# Patient Record
Sex: FEMALE | Race: WHITE | NOT HISPANIC OR LATINO
[De-identification: names, ages, dates, MRNs, and addresses within clinical notes are randomized per-mention and may not be internally consistent; named-entity substitution may affect disease eponyms.]

---

## 2024-02-05 ENCOUNTER — TRANSCRIPTION ENCOUNTER (OUTPATIENT)
Age: 54
End: 2024-02-05

## 2024-03-11 ENCOUNTER — APPOINTMENT (OUTPATIENT)
Dept: UROGYNECOLOGY | Facility: CLINIC | Age: 54
End: 2024-03-11
Payer: COMMERCIAL

## 2024-03-11 VITALS — WEIGHT: 166 LBS | BODY MASS INDEX: 31.34 KG/M2 | HEIGHT: 61 IN

## 2024-03-11 DIAGNOSIS — R93.89 ABNORMAL FINDINGS ON DIAGNOSTIC IMAGING OF OTHER SPECIFIED BODY STRUCTURES: ICD-10-CM

## 2024-03-11 DIAGNOSIS — R31.0 GROSS HEMATURIA: ICD-10-CM

## 2024-03-11 PROBLEM — Z00.00 ENCOUNTER FOR PREVENTIVE HEALTH EXAMINATION: Status: ACTIVE | Noted: 2024-03-11

## 2024-03-11 PROCEDURE — 99459 PELVIC EXAMINATION: CPT

## 2024-03-11 PROCEDURE — 51701 INSERT BLADDER CATHETER: CPT | Mod: 59

## 2024-03-11 PROCEDURE — 81003 URINALYSIS AUTO W/O SCOPE: CPT | Mod: QW

## 2024-03-11 PROCEDURE — 99204 OFFICE O/P NEW MOD 45 MIN: CPT | Mod: 25

## 2024-03-11 RX ORDER — PHENAZOPYRIDINE HYDROCHLORIDE 200 MG/1
200 TABLET ORAL 3 TIMES DAILY
Qty: 15 | Refills: 3 | Status: ACTIVE | COMMUNITY
Start: 2024-03-11 | End: 1900-01-01

## 2024-03-11 NOTE — ASSESSMENT
[FreeTextEntry1] : Patient is a 54-year-old multipara who is presenting with symptoms of intermittent gross hematuria, concerns for recurrent urinary tract infection, mixed urinary incontinence (stress greater than urgency) as well as urinary urgency for past 2 years.  She does not recall any positive urine culture except 1 growing group B strep.  On exam, she was noted to have stage II pelvic organ prolapse mainly involving the anterior vaginal wall, normal postvoid residual, hypermobile urethra and a negative cough stress test.  Patient has history of uterine polyp removal.  She was noted to have thickened endometrium on a CT scan from August 2023.  She reports having a normal pelvic ultrasound in October 2023.  However she had recurrent episode of gross hematuria most recently in January 2024.

## 2024-03-11 NOTE — DISCUSSION/SUMMARY
[FreeTextEntry1] : Patient was counseled regarding evaluation and management of gross hematuria, frequent urinary tract infections, mixed urinary incontinence, and pelvic organ prolapse.  Following management plan was outlined after having a detailed discussion with the patient: 1. UA, culture ordered from cath specimen today. If it returns positive, will treat the infection as indicated with antibiotics such as Keflex 500 mg p.o. twice daily for 7 days  2.  She was advised to sign the release form to obtain reports of previous urine cultures from [facility] and her [facility] office. 3. Provided her scripts for urine culture, sterile cups, lab info etc and advised her to get a culture done and call our office if she develops recurrent symptoms 4.  Recommended cystoscopy for further evaluation of intermittent gross hematuria.   5.  Recommended urodynamic testing for further evaluation of urinary incontinence. 6.  Recommended repeating pelvic ultrasound to exclude thickened endometrium and postmenopausal bleeding as the source of hematuria.  Prescription provided.  She was advised to reach out to her OB/GYN GYN first 7.  Discussed treatment options for stress urinary incontinence including pessary and retropubic mid urethral sling placement.  She has incontinence episodes during intimacy and hence pessary will not provide complete relief for her symptoms.  She is interested in retropubic mid urethral sling procedure.  Discussed outpatient nature of the procedure, recovery time, postoperative restrictions etc.  She was also provided with brochure regarding the procedures.  She will return after urodynamic testing for further discussion. 8. Discussed management options for pelvic organ prolapse , including pessary as well as surgical management in the form of native tissue isolated anterior repair versus anterior colporrhaphy with sacrospinous hysteropexy versus vaginal hysterectomy, uterosacral ligament suspension, anterior repair, possible posterior repair versus supracervical hysterectomy with sacrocolpopexy.  Patient states that she has not felt vaginal pressure or bulge. She would like to think about these options.  Will discuss it further on follow-up visit

## 2024-03-11 NOTE — PROCEDURE
[Straight Catheterization] : insertion of a straight catheter [Hematuria] : hematuria [Stress Incontinence] : stress incontinence [Urinary Frequency] : urinary frequency [Patient] : the patient [___ Fr Straight Tip] : a [unfilled] in [place] straight tip catheter [None] : there were no complications with the catheter insertion [Clear] : clear [Culture] : culture [No Complications] : no complications [Urinalysis] : urinalysis [Tolerated Well] : the patient tolerated the procedure well [Post procedure instructions and information given] : Post procedure instructions and information were given and reviewed with patient. [0] : 0

## 2024-03-11 NOTE — HISTORY OF PRESENT ILLNESS
[FreeTextEntry1] : Patient is a 54-year-old Para 2 ( x 1 for breech ,  x 1 largest baby 8 lbs)who is referred by Dr. Wyatt for evaluation and management of urinary frequency, recurrent urinary tract infections as well as urinary incontinence. Patient reports symptoms of recurrent UTI for the past 2 years.  On further questioning, she reports intermittent episodes of noticing blood in the toilet on voiding.  Most recent episode happened in 2024.  She states that typically these episodes are not accompanied by any pain with urination, flank pain or changes in baseline urinary frequency and urgency.  She assumed that this is a result of urinary tract infections.  She has been receiving [facility].  She was prescribed Cipro by Dr. Malik in 2023 She also reports urinary urgency Leaks urine with biking, exercising, sneeze, coughing, foreplay etc. This started few years ago but  for past 1 year, its worse Doesn't require use of incontinence pads No sensation of incomplete bladder emptying  I reviewed patient's CT scan from 2023 which was done at [facility] for left upper quadrant pain.  Her kidneys were noted to be normal appearance without evidence of hydronephrosis or nephrolithiasis.  No abnormality of the bladder wall noted.  Other findings included colonic diverticulitis Daily fluid intake : water, 20-30 oz of coffee, rare tea, social alcohol  Bowel symptoms: Reports intermittent constipation.  History of acute diverticulitis in 2023.  History of colon polyp removal.  Denies fecal incontinence.  Reports rare diarrhea.  Vaginal symptoms: Denies vaginal bulge or pressure.  Reports vaginal dryness - useS water based lubrication   GYN Hx: Reports history of uterine polyp removal.  Denies history of abnormal Pap smears.  Denies history of abnormal mammograms  Past surgical history:  x 1, abdominoplasty, cholecystectomy, polypectomy (uterine polyp removal)

## 2024-03-11 NOTE — PHYSICAL EXAM
[Chaperone Present] : A chaperone was present in the examining room during all aspects of the physical examination [FreeTextEntry1] : General: Not in acute distress, alert and oriented x3. Neck: Supple. No lymphadenopathy.  Abdomen: Soft, nontender, and nondistended. No obvious hepatosplenomegaly. No obvious hernias.  A long transverse scar of abdominoplasty noted Pelvic Exam: Normal external female genitalia. Saddle sensory exam S2 to S4 is intact. Perineal reflexes not visualized. Urethra is hypermobile without prolapse, exudates, or lesions. Cough stress test is negative. Post void residual was checked with I/O cath and was 20 cc of clear urine.  Normal appearing vaginal epithelium. No vaginal blood or discharge however brownish discharge was noted at the external os of the cervix. Cervix without abnormal lesions. Bimanual exam reveals a small uterus in normal positioning. No palpable adnexal masses or tenderness.  POPQ: Aa - 0.5, Ba -0.5, C -4, TVL 9, D-5, Ap -1.5, Bp -1.5.

## 2024-03-12 LAB
APPEARANCE: CLEAR
BACTERIA: NEGATIVE /HPF
BILIRUBIN URINE: NEGATIVE
BLOOD URINE: NEGATIVE
CALCIUM OXALATE CRYSTALS: PRESENT
CAST: 1 /LPF
COLOR: YELLOW
EPITHELIAL CELLS: 0 /HPF
GLUCOSE QUALITATIVE U: NEGATIVE MG/DL
KETONES URINE: NEGATIVE MG/DL
LEUKOCYTE ESTERASE URINE: NEGATIVE
MICROSCOPIC-UA: NORMAL
NITRITE URINE: NEGATIVE
PH URINE: 6.5
PROTEIN URINE: NORMAL MG/DL
RED BLOOD CELLS URINE: NORMAL /HPF
REVIEW: NORMAL
SPECIFIC GRAVITY URINE: 1.03
UROBILINOGEN URINE: 1 MG/DL
WHITE BLOOD CELLS URINE: 0 /HPF

## 2024-03-13 LAB — BACTERIA UR CULT: NORMAL

## 2024-03-20 ENCOUNTER — RESULT CHARGE (OUTPATIENT)
Age: 54
End: 2024-03-20

## 2024-03-20 ENCOUNTER — APPOINTMENT (OUTPATIENT)
Dept: UROGYNECOLOGY | Facility: CLINIC | Age: 54
End: 2024-03-20
Payer: COMMERCIAL

## 2024-03-25 ENCOUNTER — RESULT CHARGE (OUTPATIENT)
Age: 54
End: 2024-03-25

## 2024-03-25 ENCOUNTER — APPOINTMENT (OUTPATIENT)
Dept: UROGYNECOLOGY | Facility: CLINIC | Age: 54
End: 2024-03-25
Payer: COMMERCIAL

## 2024-03-25 DIAGNOSIS — N39.46 MIXED INCONTINENCE: ICD-10-CM

## 2024-03-25 DIAGNOSIS — R39.15 URGENCY OF URINATION: ICD-10-CM

## 2024-03-25 LAB
LEUKOCYTE ESTERASE UR QL STRIP: NEGATIVE
NITRITE UR QL STRIP: NEGATIVE

## 2024-03-25 PROCEDURE — 51741 ELECTRO-UROFLOWMETRY FIRST: CPT

## 2024-03-25 PROCEDURE — 81003 URINALYSIS AUTO W/O SCOPE: CPT | Mod: QW

## 2024-03-25 PROCEDURE — 51784 ANAL/URINARY MUSCLE STUDY: CPT

## 2024-03-25 PROCEDURE — 51797 INTRAABDOMINAL PRESSURE TEST: CPT

## 2024-03-25 PROCEDURE — 51728 CYSTOMETROGRAM W/VP: CPT

## 2024-03-26 LAB
APPEARANCE: CLEAR
BACTERIA: NEGATIVE /HPF
BILIRUBIN URINE: NEGATIVE
BLOOD URINE: NEGATIVE
CAST: 0 /LPF
COLOR: YELLOW
EPITHELIAL CELLS: 0 /HPF
GLUCOSE QUALITATIVE U: NEGATIVE MG/DL
KETONES URINE: NEGATIVE MG/DL
LEUKOCYTE ESTERASE URINE: NEGATIVE
MICROSCOPIC-UA: NORMAL
NITRITE URINE: NEGATIVE
PH URINE: 7
PROTEIN URINE: NEGATIVE MG/DL
RED BLOOD CELLS URINE: 0 /HPF
SPECIFIC GRAVITY URINE: <1.005
UROBILINOGEN URINE: 0.2 MG/DL
WHITE BLOOD CELLS URINE: 0 /HPF

## 2024-03-27 ENCOUNTER — APPOINTMENT (OUTPATIENT)
Dept: UROGYNECOLOGY | Facility: CLINIC | Age: 54
End: 2024-03-27
Payer: COMMERCIAL

## 2024-03-27 LAB — BACTERIA UR CULT: NORMAL

## 2024-03-27 PROCEDURE — 52000 CYSTOURETHROSCOPY: CPT

## 2024-04-01 ENCOUNTER — APPOINTMENT (OUTPATIENT)
Dept: UROGYNECOLOGY | Facility: CLINIC | Age: 54
End: 2024-04-01
Payer: COMMERCIAL

## 2024-04-01 DIAGNOSIS — N39.3 STRESS INCONTINENCE (FEMALE) (MALE): ICD-10-CM

## 2024-04-01 PROCEDURE — 99214 OFFICE O/P EST MOD 30 MIN: CPT

## 2024-04-01 NOTE — ASSESSMENT
[FreeTextEntry1] : The above test findings are consistent with increase sensation, normal cystometric capacity, normal compliance, absence of detrusor overactivity, presence of stress urinary incontinence and absence of voiding dysfuction. Patient appears to be a reasonable candidate for midurethral sling placement at the time of anterior repair.

## 2024-04-01 NOTE — DISCUSSION/SUMMARY
[FreeTextEntry1] : I discussed above findings with the patient.  Discussed that she will has evidence of increased sensation however she has normal bladder capacity.  We discussed management options for stress urinary incontinence including expectant management, pelvic floor physical therapy, pessary as well as surgical management with retropubic mid urethral sling placement.  Pros and cons of each approach was discussed with her.  I discussed the procedure of pessary fitting as well as maintenance in detail.  I also discussed the procedure of retropubic mid urethral sling placement in detail including outpatient nature of the procedure, recovery time, postoperative restrictions, potential complications including being risk of urinary tract infections, changes in urine ureteral stream, voiding dysfunction etc. after a detailed discussion, patient would like to start with supervised pelvic floor therapy and weight loss.  She will follow-up in 3 to 4 months.  She will call the office to schedule placement of right retropubic mid urethral sling if she decides to proceed with surgery prior to her follow-up appointment.

## 2024-04-01 NOTE — HISTORY OF PRESENT ILLNESS
[FreeTextEntry1] : Reviewed above urodynamic testing. Briefly, patient is a 54-year-old multipara who is presenting with symptoms of intermittent gross hematuria, concerns for recurrent urinary tract infection, mixed urinary incontinence (stress greater than urgency), urinary urgency and stage II pelvic organ prolapse mainly involving the anterior vaginal wall, for past 2 years. Patient has history of uterine polyp removal. She was noted to have thickened endometrium on a CT scan from August 2023. She reports having a normal pelvic ultrasound in October 2023. However she had recurrent episode of gross hematuria most recently in January 2024.She is interested in surgical management of prolapse and stress urinary incontinence. Patient presented for urodynamic testing on 3/25/2024. Her urodynamic test findings include a normal continuous uroflow with a voided volume of 300 cc, postvoid residual of 70 cc, max flow rate of 40 , average flow rate of 31, and flow time of 10 seconds; her complex cystogram showed normal compliance, increased sensation, cystometric capacity of 272 cc, however, she voided 530 cc for pressure voiding study indicating cystometric capacity of at least 530 cc, absence of detrusor overactivity during filling and  presence of  stress urinary incontinence with cough starting at filled volume of 100 cc; she voided 530 cc at the end of the study with max flow rate of 42, average flow rate of 18, flow time of 29 seconds, detrusor pressure at max flow of 13 cm of water, detrusor contraction as the mechanism of void and normal continuous configuration of uroflow.  Patient had cystoscopy on 3/25/2024 which was normal.  No obvious etiology for intermittent hematuria noted.  She denies any recurrence of vaginal bleeding/hematuria.